# Patient Record
Sex: FEMALE | Race: WHITE | NOT HISPANIC OR LATINO | Employment: PART TIME | URBAN - METROPOLITAN AREA
[De-identification: names, ages, dates, MRNs, and addresses within clinical notes are randomized per-mention and may not be internally consistent; named-entity substitution may affect disease eponyms.]

---

## 2023-01-05 ENCOUNTER — OFFICE VISIT (OUTPATIENT)
Dept: CARDIOLOGY | Facility: MEDICAL CENTER | Age: 38
End: 2023-01-05
Payer: COMMERCIAL

## 2023-01-05 ENCOUNTER — NON-PROVIDER VISIT (OUTPATIENT)
Dept: CARDIOLOGY | Facility: MEDICAL CENTER | Age: 38
End: 2023-01-05
Payer: COMMERCIAL

## 2023-01-05 VITALS
SYSTOLIC BLOOD PRESSURE: 112 MMHG | HEART RATE: 84 BPM | BODY MASS INDEX: 25.16 KG/M2 | DIASTOLIC BLOOD PRESSURE: 74 MMHG | WEIGHT: 142 LBS | HEIGHT: 63 IN | RESPIRATION RATE: 16 BRPM | OXYGEN SATURATION: 96 %

## 2023-01-05 DIAGNOSIS — I49.1 APC (ATRIAL PREMATURE CONTRACTIONS): ICD-10-CM

## 2023-01-05 DIAGNOSIS — Z71.89 COUNSELING ON HEALTH PROMOTION AND DISEASE PREVENTION: ICD-10-CM

## 2023-01-05 DIAGNOSIS — I49.3 PVC (PREMATURE VENTRICULAR CONTRACTION): ICD-10-CM

## 2023-01-05 DIAGNOSIS — R00.2 PALPITATIONS: ICD-10-CM

## 2023-01-05 DIAGNOSIS — R00.2 PALPITATIONS: Primary | ICD-10-CM

## 2023-01-05 PROCEDURE — 99203 OFFICE O/P NEW LOW 30 MIN: CPT | Performed by: INTERNAL MEDICINE

## 2023-01-05 RX ORDER — DICLOFENAC POTASSIUM 50 MG/1
TABLET, FILM COATED ORAL
COMMUNITY
Start: 2022-12-20 | End: 2023-01-05

## 2023-01-05 RX ORDER — AMOXICILLIN AND CLAVULANATE POTASSIUM 875; 125 MG/1; MG/1
1 TABLET, FILM COATED ORAL 2 TIMES DAILY
COMMUNITY
Start: 2022-11-06 | End: 2023-01-05

## 2023-01-05 RX ORDER — ONDANSETRON 4 MG/1
4 TABLET, ORALLY DISINTEGRATING ORAL
COMMUNITY
Start: 2022-07-13 | End: 2023-01-05

## 2023-01-05 ASSESSMENT — FIBROSIS 4 INDEX: FIB4 SCORE: 0.6

## 2023-01-05 NOTE — PROGRESS NOTES
Patient enrolled in the 14 day Zio XT Holter monitoring program, per Nicolas Warren M.D.  >In clinic hook up, monitor S/N [id number].  >Pending EOS.

## 2023-01-05 NOTE — PROGRESS NOTES
CARDIOLOGY NEW PATIENT:    PCP: Arianna Anaya M.D.    1. Palpitations    2. PVC (premature ventricular contraction)    3. Counseling on health promotion and disease prevention        Kyleigh Cherry referred for PVCs.    Chief Complaint   Patient presents with    Other     NP Dx: Bigeminy       History: Kyleigh Cherry is a 37 y.o. female here for palpitations. She os a ER nurse.    [place] [facility] [place] with chest palpitations and chest pressure, reassuring work-up in the ER, with EKG showing ventricular bigeminy that resolved on subsequent EKG.  Since she was feeling better with resolution of her PVCs, got 1L of fluids, she was discharged home with cardiology referral. Since thanksgiving had recurrence of palpitations but with chest pressure then. Since then, she is having symptoms every 2-3 days. 2 nights ago woke up at 3am and felt palpitations / chest squeeze / heaviness / breathlessness / dizziness. She was pregnant 12/8/22 at 6 weeks and then lost the baby.    Feb 2022: after a night shift, drank a lot of coffee, energy drink and had palpitations, went to an ER at [facility] on [facility]. Took prapranolol 20mg a week later for mild symptoms. Then her TSH was reportedly normal.    Exercises regularly without limitations of CP or SOB. No REYNALDO, orthopnea, PND.    EKG 12/6/22 5:46pm: Personally reviewed  Sinus rhythm, RSR' in V1 and V2, probably normal variant    I do not have the EKG that reportedly showed ventricular bigeminy in the ER on 12/6/2022 under media tab.    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  Non smoker  No marijuana use  Social alcohol use    Cardiovascular Risk Factors:  1. Smoking status: No  2. Type II Diabetes Mellitus: No  3. Hypertension: No  4. Dyslipidemia: No  5. Obesity / metabolic syndrome: No  6. Family history of ASCVD: Yes, maternal grandfather  7. Family history of premature ASCVD in a first degree relative (Male less than 55 years of  "age; Female less than 60 years of age): No  8. Sedentary lifestyle: No  9. Lack of exercise: No      PE:  /74 (BP Location: Left arm, Patient Position: Sitting, BP Cuff Size: Adult)   Pulse 84   Resp 16   Ht 1.6 m (5' 3\")   Wt 64.4 kg (142 lb)   SpO2 96%   BMI 25.15 kg/m²     Gen: well  HEENT: Symmetric face. Anicteric sclerae. Moist mucus membranes  NECK: No JVD. No lymphadenopathy  CARDIAC: Regular, Normal S1, S2, No murmur  VASCULATURE: carotids are normal bilaterally without bruit  RESP: Clear to auscultation bilaterally  ABD: Soft, non-tender, non-distended  EXT: No edema, no clubbing or cyanosis  SKIN: Warm and dry  NEURO: No gross deficits  PSYCH: Appropriate affect, participates in conversation    The ASCVD Risk score (Apoorva SAAVEDRA, et al., 2019) failed to calculate.    Past Medical History:   Diagnosis Date    Flu 1/11/2013     Past Surgical History:   Procedure Laterality Date    OTHER SURGICAL PROCEDURE  2003    oral surgery     No Known Allergies  Outpatient Encounter Medications as of 1/5/2023   Medication Sig Dispense Refill    Omega-3 Fatty Acids (FISH OIL PO) Take  by mouth.      VITAMIN D PO Take  by mouth.      [DISCONTINUED] amoxicillin-clavulanate (AUGMENTIN) 875-125 MG Tab Take 1 Tablet by mouth 2 times a day. (Patient not taking: Reported on 1/5/2023)      [DISCONTINUED] diclofenac (CATAFLAM) 50 MG tablet  (Patient not taking: Reported on 1/5/2023)      [DISCONTINUED] ondansetron (ZOFRAN ODT) 4 MG TABLET DISPERSIBLE Take 4 mg by mouth. (Patient not taking: Reported on 1/5/2023)      [DISCONTINUED] Prenatal Vit-Fe Fumarate-FA (PRENATAL VITAMIN PO) Take 1 Tablet by mouth. (Patient not taking: Reported on 1/5/2023)       No facility-administered encounter medications on file as of 1/5/2023.     Social History     Socioeconomic History    Marital status:      Spouse name: Not on file    Number of children: Not on file    Years of education: Not on file    Highest education level: " Not on file   Occupational History    Not on file   Tobacco Use    Smoking status: Never    Smokeless tobacco: Never   Substance and Sexual Activity    Alcohol use: Yes     Comment: socially    Drug use: No    Sexual activity: Yes     Partners: Male     Birth control/protection: I.U.D.   Other Topics Concern    Not on file   Social History Narrative    Not on file     Social Determinants of Health     Financial Resource Strain: Not on file   Food Insecurity: Not on file   Transportation Needs: Not on file   Physical Activity: Not on file   Stress: Not on file   Social Connections: Not on file   Intimate Partner Violence: Not on file   Housing Stability: Not on file     Family History   Problem Relation Age of Onset    Hypertension Mother     Cancer Father         Melanoma    Hypertension Father     Hyperlipidemia Father     Heart Disease Maternal Grandfather         70's         Studies    Lab Results   Component Value Date/Time    TSHULTRASEN 1.67 07/25/2013 0000      No results found for: FREET4   No results found for: HBA1C  No results found for: CHOLSTRLTOT, LDL, HDL, TRIGLYCERIDE    Lab Results   Component Value Date/Time    SODIUM 135 (L) 12/06/2022 04:00 PM    POTASSIUM 3.5 12/06/2022 04:00 PM    CHLORIDE 102 12/06/2022 04:00 PM    CO2 24 12/06/2022 04:00 PM    GLUCOSE 125 (H) 12/06/2022 04:00 PM    BUN 15 12/06/2022 04:00 PM    CREATININE 0.7 12/06/2022 04:00 PM     Lab Results   Component Value Date/Time    ALKPHOSPHAT 44 (L) 12/06/2022 04:00 PM    ASTSGOT 20 12/06/2022 04:00 PM    ALTSGPT 18 12/06/2022 04:00 PM    TBILIRUBIN 0.5 12/06/2022 04:00 PM        Echocardiogram:  No results found for this or any previous visit.        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  We will further evaluate her PVC burden via cardiac event monitor (due to insurance, unable to start with the 30day, hence 2 weeks Zio will be done) before deciding on proceeding with an echocardiogram especially if she has more than 10% PVC burden.  Accordingly we will decide on management plan.  I discussed with her the options of metoprolol, propranolol as needed and flecainide, and we will finalize according to above testing results.    Congratulated her on her efforts to continue to exercise and to eat a heart healthy diet, and to stay well-hydrated.    Thank you for the opportunity to be involved in Kyleigh Cherry 's care; and please reach out with any questions or concerns.    Return if symptoms worsen or fail to improve.    Nicolas Warren MD, MPH [facility]  Interventional Cardiologist  [facility]   of Clinical Internal Medicine - [facility]    ~ Portions of this note were completed using voice recognition software (Dragon Naturally speaking software) . Occasional transcription errors may have escaped proof reading. I have made every reasonable attempt to correct obvious errors, but I expect that there are errors of grammar and possibly content that I did not discover before finalizing the note. ~

## 2023-01-31 ENCOUNTER — TELEPHONE (OUTPATIENT)
Dept: CARDIOLOGY | Facility: MEDICAL CENTER | Age: 38
End: 2023-01-31
Payer: COMMERCIAL

## 2023-01-31 DIAGNOSIS — R00.2 PALPITATIONS: ICD-10-CM

## 2023-01-31 DIAGNOSIS — I49.3 PVC (PREMATURE VENTRICULAR CONTRACTION): ICD-10-CM

## 2023-02-01 PROCEDURE — 93248 EXT ECG>7D<15D REV&INTERPJ: CPT | Performed by: INTERNAL MEDICINE

## 2023-02-01 PROCEDURE — 93246 EXT ECG>7D<15D RECORDING: CPT | Performed by: INTERNAL MEDICINE

## 2024-11-14 PROBLEM — O09.529 ANTEPARTUM MULTIGRAVIDA OF ADVANCED MATERNAL AGE: Status: ACTIVE | Noted: 2024-11-14

## 2024-11-18 ENCOUNTER — INITIAL PRENATAL (OUTPATIENT)
Dept: OBGYN | Facility: CLINIC | Age: 39
End: 2024-11-18
Payer: COMMERCIAL

## 2024-11-18 ENCOUNTER — HOSPITAL ENCOUNTER (OUTPATIENT)
Facility: MEDICAL CENTER | Age: 39
End: 2024-11-18
Attending: OBSTETRICS & GYNECOLOGY
Payer: COMMERCIAL

## 2024-11-18 VITALS — DIASTOLIC BLOOD PRESSURE: 80 MMHG | SYSTOLIC BLOOD PRESSURE: 127 MMHG | WEIGHT: 143.5 LBS | BODY MASS INDEX: 25.42 KG/M2

## 2024-11-18 DIAGNOSIS — R00.2 PALPITATIONS: ICD-10-CM

## 2024-11-18 DIAGNOSIS — Z34.81 ENCOUNTER FOR SUPERVISION OF OTHER NORMAL PREGNANCY, FIRST TRIMESTER: ICD-10-CM

## 2024-11-18 DIAGNOSIS — O09.529 ANTEPARTUM MULTIGRAVIDA OF ADVANCED MATERNAL AGE: Primary | ICD-10-CM

## 2024-11-18 PROCEDURE — 3079F DIAST BP 80-89 MM HG: CPT | Performed by: OBSTETRICS & GYNECOLOGY

## 2024-11-18 PROCEDURE — 87491 CHLMYD TRACH DNA AMP PROBE: CPT

## 2024-11-18 PROCEDURE — 88142 CYTOPATH C/V THIN LAYER: CPT

## 2024-11-18 PROCEDURE — 87591 N.GONORRHOEAE DNA AMP PROB: CPT

## 2024-11-18 PROCEDURE — 0501F PRENATAL FLOW SHEET: CPT | Performed by: OBSTETRICS & GYNECOLOGY

## 2024-11-18 PROCEDURE — 3074F SYST BP LT 130 MM HG: CPT | Performed by: OBSTETRICS & GYNECOLOGY

## 2024-11-18 RX ORDER — MAGNESIUM GLYCINATE 100 MG
CAPSULE ORAL
COMMUNITY

## 2024-11-18 RX ORDER — ASPIRIN 81 MG/1
81 TABLET ORAL DAILY
Qty: 100 TABLET | Refills: 2 | Status: SHIPPED | OUTPATIENT
Start: 2024-11-18

## 2024-11-18 RX ORDER — ONDANSETRON 4 MG/1
4 TABLET, FILM COATED ORAL EVERY 4 HOURS PRN
Qty: 20 TABLET | Refills: 1 | Status: SHIPPED | OUTPATIENT
Start: 2024-11-18

## 2024-11-18 ASSESSMENT — EDINBURGH POSTNATAL DEPRESSION SCALE (EPDS)
I HAVE FELT SCARED OR PANICKY FOR NO GOOD REASON: NO, NOT MUCH
THE THOUGHT OF HARMING MYSELF HAS OCCURRED TO ME: NEVER
I HAVE FELT SAD OR MISERABLE: NO, NOT AT ALL
I HAVE BEEN SO UNHAPPY THAT I HAVE BEEN CRYING: NO, NEVER
THINGS HAVE BEEN GETTING ON TOP OF ME: NO, I HAVE BEEN COPING AS WELL AS EVER
I HAVE BEEN ANXIOUS OR WORRIED FOR NO GOOD REASON: YES, VERY OFTEN
I HAVE BLAMED MYSELF UNNECESSARILY WHEN THINGS WENT WRONG: NO, NEVER
I HAVE BEEN ABLE TO LAUGH AND SEE THE FUNNY SIDE OF THINGS: AS MUCH AS I ALWAYS COULD
TOTAL SCORE: 4
I HAVE BEEN SO UNHAPPY THAT I HAVE HAD DIFFICULTY SLEEPING: NOT AT ALL
I HAVE LOOKED FORWARD WITH ENJOYMENT TO THINGS: AS MUCH AS I EVER DID

## 2024-11-18 ASSESSMENT — FIBROSIS 4 INDEX: FIB4 SCORE: 0.64

## 2024-11-18 NOTE — PROGRESS NOTES
Establish Pregnancy Visit    CC: First OB Visit    HPI: Patient is a 39 y.o.  at 11w2d who presents for her first OB visit.  She is not yet feeling fetal movement.  She denies vaginal bleeding, denies nausea, denies vomiting.  Mild cramping, intermittent. She denies headaches, or urinary symptoms.      The patient reports that this pregnancy is a Desired. FOP is Involved. Feels she has adequate support in this pregnancy. FOB is different than other children.     - History of genital HSV: no  - History of Varicella vaccination or infection: yes - infection as a child  - Personal, FOB, or family history of congenital anomalies or neuro-cognitive delays:no  - Accepts blood products if indicated: yes     DATING:     Procedure:  Transabdominal  US performed by me and per my read:  Indication: Amenorrhea, +UPT.     First Trimester US:  Type of US Transabdominal   LMP  ~ 24, uncertain   Fetal Number  1   Yolk Sac not seen   CRL 5.15 mm   Gestational age by CRL  11w6d   Cardiac Activity Present, Regular;, and ~130 BPM   Cervix/Uterus/Gestational sac Normal uterine echotexture and grossly normal shape.  Normal cervix.   Normal appearance of the gestational sac.   Location of pregnancy Intrauterine   Adnexa and bladder Right ovary:Not seen   Left ovary:Not seen   Corpus Luteum - Not Seen  Bladder: Normal   Final Diagnosis Viable IUP   Final SHELBY  06/3/2025   by 1T US (not certain LMP)       GYN HX:   Last Pap: Thinks ~ 1 year ago  Hx Moderate or Severe Dysplasia : no  History of LEEP or CKC: no  Hx STD : no      OBSTETRIC HISTORY:  OB History    Para Term  AB Living   4 2 2   1 2   SAB IAB Ectopic Molar Multiple Live Births     1       2      # Outcome Date GA Lbr Bar/2nd Weight Sex Type Anes PTL Lv   4 Current            3 IAB  6w0d    TAB         Birth Comments: Elected     2 Term 17 40w2d  6 lb 5 oz F Vag-Spont None N FERNANDO      Birth Comments: NAtural no complications   1 Term  10/16/13 40w2d  7 lb M Vag-Spont EPI Y FERNANDO      Birth Comments: pretem, labor @ 28 weeks, got medication, caused by costochondritis, carried to term       MEDICAL HISTORY:  Past Medical History:   Diagnosis Date    Flu 01/11/2013    Palpitations     s/p heart monitor, rare PVC's, no other abnormalities       SURGICAL HISTORY:  Past Surgical History:   Procedure Laterality Date    OTHER SURGICAL PROCEDURE  01/01/2003    oral surgery, wisdom teeth    OTHER      [facility]    OTHER SURGICAL PROCEDURE      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       FAMILY HISTORY:  Family History   Problem Relation Age of Onset    Hypertension Mother     Cancer Father         Melanoma    Hypertension Father     Hyperlipidemia Father     Prostate cancer Father     Alcohol abuse Brother         One borther    Parkinson's Disease Maternal Grandmother     Dementia Maternal Grandmother     Dementia Maternal Grandfather     Stroke Maternal Grandfather     Heart Disease Maternal Grandfather         70's       MEDICATIONS:  Current Outpatient Medications on File Prior to Visit   Medication Sig Dispense Refill    Magnesium Glycinate 100 MG Cap Take  by mouth.      Prenatal MV-Min-Fe Fum-FA-DHA (PRENATAL 1 PO) Take  by mouth.      Doxylamine Succinate, Sleep, (UNISOM PO) Take  by mouth 1 time a day as needed.      Omega-3 Fatty Acids (FISH OIL PO) Take  by mouth.      VITAMIN D PO Take  by mouth.       No current facility-administered medications on file prior to visit.       ALLERGIES / REACTIONS:  No Known Allergies             SOCIAL HISTORY:   reports that she has never smoked. She has never used smokeless tobacco. She reports that she does not currently use alcohol. She reports that she does not currently use drugs after having used the following drugs: Marijuana.    RN in ED- [employer]    ROS:   Gen: no fevers or chills, no significant weight loss or gain, excessive fatigue  Respiratory:  no cough or  dyspnea  Cardiac:  no chest pain, no palpitations, no syncope  Breast: no breast discharge, pain, lump or skin changes  GI:  no heartburn, no abdominal pain, no nausea or vomiting  Urinary: no dysuria, urgency, frequency, incontinence   Psych: no depression or anxiety  Neuro: no migraines with aura, fainting spells, numbness or tingling  Extremities: no joint pain, persistently swollen ankles, recurrent leg cramps         PHYSICAL EXAMINATION:  Vital Signs:   Vitals:    11/18/24 1324   BP: 127/80   Weight: 143 lb 8 oz     Body mass index is 25.42 kg/m².  Constitutional: The patient is well developed and well nourished.  Psychiatric: Patient is oriented to time place and person.   Skin: No rash observed.  Breast: Normal skin, areolas; no masses or tenderness bilaterally. No adenopathy bilaterally.  Neck: Neck appears symmetric. There are no masses or adenopathy present.  Respiratory: normal effort on room air  Cardiac: regular rate  Abdomen: Soft, non-tender.  Pelvic:    Vulva: normal.    Urethra: normal.   Vagina: normal.    Cervix: normal.    Uterus: consistent with dates    Adnexa: normal.   Perineum: normal.   GC / Chlamydia cultures obtained.   Pap Smear Obtained: yes  Extremeties: Legs are symmetric and without tenderness. There is no edema present.    ACOG SCREENING  Infection Prevention  1. High Risk For HIV: No 6. Rash Or Illness Since LMP: No     2. High Risk For Hepatitis B or C: No 7. History Of STD, GC, Chlamydia, HPV Syphilis: No     3. Live With Someone With TB Or Exposed To TB: No 8. Have a cat in the home?: No     4. Patient Or Partner Has A History Of Herpes: No 8a. Responsible for changing the litter?: No     5. History of Chicken Pox: Yes (Comment: As a child) 9. Other (See Comments Below): No   Comments: Pregnancy unplanned and wanted. FOB involved, not the same father as other children. MOB currently working.          Genetic Screening/Teratology Counseling- Includes patient, baby's father, or  anyone in either family with:  Patient's age 35 years or older as of estimated date of delivery: No     Thalassemia (Italian, Greek, Mediterranean, or  background): MCV less than 80: No     Neural tube defect (Meningomyelocele, Spina bifida, or Anencephaly): No     Congenital heart defect: No     Down syndrome: No     Rogers-Sachs (Ashkenazi Rastafarian, Cajun, Tajik [place]): No     Canavan disease (Ashkenazi Rastafarian): No     Familial dysautonomia (Ashkenazi Rastafarian): No     Sickle cell disease or trait (): No     Hemophilia or other blood disorders: No     Muscular dystrophy: No    Cystic fibrosis: No     [place]'s chorea: No     Mental retardation/autism: No     Other inherited genetic or chromosomal disorder: No     Maternal metabolic disorder (eg. Type 1 diabetes, PKU): No     Patient or baby's father had child with birth defects not listed above: No     Recurrent pregnancy loss, or a stillbirth: No     Medications (including supplements, vitamins, herbs, or OTC drugs)/illicit/recreational drugs/alcohol since last menstrual period: No                 ASSESSMENT AND PLAN:  39 y.o.  at 11w2d who presents for new OB visit. Dated by  11.6wk US, final SHELBY: 6/3/24  Assessment & Plan  Palpitations  S/p 2 week zio patch monitor, rare PVC's, no other issues  [x] monitor symptoms in pregnancy  [ ] follow-up with cardiology for palpitations PRN       Antepartum multigravida of advanced maternal age  [ ] 1T labs: ordered   [ ] Pap: collected   [ ] Genetic screening: Panorama NIPT and Hemoglobinopathy  [ ] AFP (15-20 wks): next visit  Orders:    URINE CULTURE(NEW); Future    URINE DRUG SCREEN W/CONF (AR); Future    RUBELLA ABS IGG; Future    ABO AND RH DETERMINATION; Future    ANTIBODY SCREEN; Future    CBC WITHOUT DIFFERENTIAL; Future    Comp Metabolic Panel; Future    HEP C VIRUS ANTIBODY; Future    T.PALLIDUM AB MARLON (SCREENING); Future    HIV AG/AB COMBO ASSAY SCREENING; Future    HEMOGLOBINOPATHY  PROFILE; Future    HEMOGLOBIN A1C; Future    HEP B SURFACE ANTIGEN; Future    US-OB 2ND 3RD TRI COMPLETE; Future      Routine OB care:  - Labs/US: Reviewed and ordered routine PNL and anatomy US.   - Genetics: Discussed options for genetic/aneuploidy and hemoglobinopathy testing and information given for pt to consider.  Advised to call insurance for cost of testing.  - Desires: Panorama NIPT and Hemoglobinopathy.   - PIH risk reduction: Low Dose ASA indicated?: Yes, Rx provided.  - Weight gain goal: BMI 25.0-29.9: 15-25 lbs    Counseling:  - PNV: Recommended continuing or starting PNV if not already taking  - Diet: Increase water intake and encouraged healthy nutrition, reviewed recommended changes to diet in pregnancy.  - Vaccines: Discussed recommendation for TDAP vaccination; additionally recommended flu, Covid vaccine, and RSV during pregnancy based on season and timing of last immunization.  - Exercise: Reviewed that moderate exercise into the 3rd trimester is associated with improved outcomes. Any contact sports, extreme activities, or that requires more than moderate exertion should be discussed before undertaking.   - Office policies: Discussed office policies, prenatal care timeline, weight gain, diet and activity. Pregnancy packet provided.   - Reviewed indications to seek emergency care: including heavy bleeding, LOF, of severe abdominal pain.    Return in 4 weeks for next prenatal visit.     Cary Quintero MD  Obstetrics and Gynecology

## 2024-11-18 NOTE — ASSESSMENT & PLAN NOTE
S/p 2 week zio patch monitor, rare PVC's, no other issues  [x] monitor symptoms in pregnancy  [ ] follow-up with cardiology for palpitations PRN

## 2024-11-18 NOTE — PROGRESS NOTES
Patient here for New OB   LMP: 8/31/24 Approx   SHELBY: 6/7/25  GA: 11w2d   Last pap: within the last year with PCP, MAGALY per patient   Phone: [phone]  Pharmacy verified   EDPS: 4  Genetic testing: NIPT, AFP     Pt states she is following up with cardiologists for her palpitations , she had unofficial US with M.D. in her office (week further ahead)

## 2024-11-18 NOTE — ASSESSMENT & PLAN NOTE
[ ] 1T labs: ordered 11/18  [ ] Pap: collected 11/18  [ ] Genetic screening: Panorama NIPT and Hemoglobinopathy  [ ] AFP (15-20 wks): next visit  Orders:    URINE CULTURE(NEW); Future    URINE DRUG SCREEN W/CONF (AR); Future    RUBELLA ABS IGG; Future    ABO AND RH DETERMINATION; Future    ANTIBODY SCREEN; Future    CBC WITHOUT DIFFERENTIAL; Future    Comp Metabolic Panel; Future    HEP C VIRUS ANTIBODY; Future    T.PALLIDUM AB MARLON (SCREENING); Future    HIV AG/AB COMBO ASSAY SCREENING; Future    HEMOGLOBINOPATHY PROFILE; Future    HEMOGLOBIN A1C; Future    HEP B SURFACE ANTIGEN; Future    US-OB 2ND 3RD TRI COMPLETE; Future

## 2024-11-19 ENCOUNTER — HOSPITAL ENCOUNTER (OUTPATIENT)
Dept: LAB | Facility: MEDICAL CENTER | Age: 39
End: 2024-11-19
Attending: OBSTETRICS & GYNECOLOGY
Payer: COMMERCIAL

## 2024-11-19 DIAGNOSIS — O09.529 ANTEPARTUM MULTIGRAVIDA OF ADVANCED MATERNAL AGE: ICD-10-CM

## 2024-11-19 DIAGNOSIS — Z34.81 ENCOUNTER FOR SUPERVISION OF OTHER NORMAL PREGNANCY, FIRST TRIMESTER: ICD-10-CM

## 2024-11-19 LAB
ABO GROUP BLD: NORMAL
ALBUMIN SERPL BCP-MCNC: 4 G/DL (ref 3.2–4.9)
ALBUMIN/GLOB SERPL: 1.4 G/DL
ALP SERPL-CCNC: 44 U/L (ref 30–99)
ALT SERPL-CCNC: 13 U/L (ref 2–50)
ANION GAP SERPL CALC-SCNC: 10 MMOL/L (ref 7–16)
AST SERPL-CCNC: 12 U/L (ref 12–45)
BILIRUB SERPL-MCNC: 0.3 MG/DL (ref 0.1–1.5)
BLD GP AB SCN SERPL QL: NORMAL
BUN SERPL-MCNC: 10 MG/DL (ref 8–22)
C TRACH DNA GENITAL QL NAA+PROBE: NEGATIVE
CALCIUM ALBUM COR SERPL-MCNC: 8.8 MG/DL (ref 8.5–10.5)
CALCIUM SERPL-MCNC: 8.8 MG/DL (ref 8.5–10.5)
CHLORIDE SERPL-SCNC: 103 MMOL/L (ref 96–112)
CO2 SERPL-SCNC: 22 MMOL/L (ref 20–33)
CREAT SERPL-MCNC: 0.44 MG/DL (ref 0.5–1.4)
ERYTHROCYTE [DISTWIDTH] IN BLOOD BY AUTOMATED COUNT: 42.5 FL (ref 35.9–50)
EST. AVERAGE GLUCOSE BLD GHB EST-MCNC: 103 MG/DL
GFR SERPLBLD CREATININE-BSD FMLA CKD-EPI: 126 ML/MIN/1.73 M 2
GLOBULIN SER CALC-MCNC: 2.8 G/DL (ref 1.9–3.5)
GLUCOSE SERPL-MCNC: 80 MG/DL (ref 65–99)
HBA1C MFR BLD: 5.2 % (ref 4–5.6)
HBV SURFACE AG SER QL: NORMAL
HCT VFR BLD AUTO: 42 % (ref 37–47)
HCV AB SER QL: NORMAL
HGB BLD-MCNC: 14.1 G/DL (ref 12–16)
HIV 1+2 AB+HIV1 P24 AG SERPL QL IA: NORMAL
MCH RBC QN AUTO: 31.4 PG (ref 27–33)
MCHC RBC AUTO-ENTMCNC: 33.6 G/DL (ref 32.2–35.5)
MCV RBC AUTO: 93.5 FL (ref 81.4–97.8)
N GONORRHOEA DNA GENITAL QL NAA+PROBE: NEGATIVE
PLATELET # BLD AUTO: 264 K/UL (ref 164–446)
PMV BLD AUTO: 10.3 FL (ref 9–12.9)
POTASSIUM SERPL-SCNC: 4.3 MMOL/L (ref 3.6–5.5)
PROT SERPL-MCNC: 6.8 G/DL (ref 6–8.2)
RBC # BLD AUTO: 4.49 M/UL (ref 4.2–5.4)
RH BLD: NORMAL
RUBV AB SER QL: 448 IU/ML
SODIUM SERPL-SCNC: 135 MMOL/L (ref 135–145)
SPECIMEN SOURCE: NORMAL
T PALLIDUM AB SER QL IA: NORMAL
WBC # BLD AUTO: 10 K/UL (ref 4.8–10.8)

## 2024-11-19 PROCEDURE — 86762 RUBELLA ANTIBODY: CPT

## 2024-11-19 PROCEDURE — 87086 URINE CULTURE/COLONY COUNT: CPT

## 2024-11-19 PROCEDURE — 87389 HIV-1 AG W/HIV-1&-2 AB AG IA: CPT

## 2024-11-19 PROCEDURE — 86780 TREPONEMA PALLIDUM: CPT

## 2024-11-19 PROCEDURE — 83036 HEMOGLOBIN GLYCOSYLATED A1C: CPT

## 2024-11-19 PROCEDURE — 36415 COLL VENOUS BLD VENIPUNCTURE: CPT

## 2024-11-19 PROCEDURE — 86900 BLOOD TYPING SEROLOGIC ABO: CPT

## 2024-11-19 PROCEDURE — 80053 COMPREHEN METABOLIC PANEL: CPT

## 2024-11-19 PROCEDURE — 85027 COMPLETE CBC AUTOMATED: CPT

## 2024-11-19 PROCEDURE — 86850 RBC ANTIBODY SCREEN: CPT

## 2024-11-19 PROCEDURE — 80307 DRUG TEST PRSMV CHEM ANLYZR: CPT

## 2024-11-19 PROCEDURE — 86803 HEPATITIS C AB TEST: CPT

## 2024-11-19 PROCEDURE — 86901 BLOOD TYPING SEROLOGIC RH(D): CPT

## 2024-11-19 PROCEDURE — 87340 HEPATITIS B SURFACE AG IA: CPT

## 2024-11-21 LAB
AMPHET CTO UR CFM-MCNC: NEGATIVE NG/ML
BACTERIA UR CULT: NORMAL
BARBITURATES CTO UR CFM-MCNC: NEGATIVE NG/ML
BENZODIAZ CTO UR CFM-MCNC: NEGATIVE NG/ML
CANNABINOIDS CTO UR CFM-MCNC: NEGATIVE NG/ML
COCAINE CTO UR CFM-MCNC: NEGATIVE NG/ML
CREAT UR-MCNC: 42.6 MG/DL (ref 20–400)
DRUG COMMENT 753798: NORMAL
METHADONE CTO UR CFM-MCNC: NEGATIVE NG/ML
OPIATES CTO UR CFM-MCNC: NEGATIVE NG/ML
PCP CTO UR CFM-MCNC: NEGATIVE NG/ML
PROPOXYPH CTO UR CFM-MCNC: NEGATIVE NG/ML
SIGNIFICANT IND 70042: NORMAL
SITE SITE: NORMAL
SOURCE SOURCE: NORMAL

## 2024-11-28 LAB
Lab: NORMAL
NTRA 1P36 DELETION SYNDROME POPULATION-BASED RISK TEXT: NORMAL
NTRA 1P36 DELETION SYNDROME RESULT TEXT: NORMAL
NTRA 1P36 DELETION SYNDROME RISK SCORE TEXT: NORMAL
NTRA 22Q11.2 DELETION SYNDROME POPULATION-BASED RISK TEXT: NORMAL
NTRA 22Q11.2 DELETION SYNDROME RESULT TEXT: NORMAL
NTRA 22Q11.2 DELETION SYNDROME RISK SCORE TEXT: NORMAL
NTRA ANGELMAN SYNDROME POPULATION-BASED RISK TEXT: NORMAL
NTRA ANGELMAN SYNDROME RESULT TEXT: NORMAL
NTRA ANGELMAN SYNDROME RISK SCORE TEXT: NORMAL
NTRA CRI-DU-CHAT SYNDROME POPULATION-BASED RISK TEXT: NORMAL
NTRA CRI-DU-CHAT SYNDROME RESULT TEXT: NORMAL
NTRA CRI-DU-CHAT SYNDROME RISK SCORE TEXT: NORMAL
NTRA FETAL FRACTION: NORMAL
NTRA GENDER OF FETUS: NORMAL
NTRA MONOSOMY X AGE-BASED RISK TEXT: NORMAL
NTRA MONOSOMY X RESULT TEXT: NORMAL
NTRA MONOSOMY X RISK SCORE TEXT: NORMAL
NTRA PRADER-WILLI SYNDROME POPULATION-BASED RISK TEXT: NORMAL
NTRA PRADER-WILLI SYNDROME RESULT TEXT: NORMAL
NTRA PRADER-WILLI SYNDROME RISK SCORE TEXT: NORMAL
NTRA TRIPLOIDY RESULT TEXT: NORMAL
NTRA TRISOMY 13 AGE-BASED RISK TEXT: NORMAL
NTRA TRISOMY 13 RESULT TEXT: NORMAL
NTRA TRISOMY 13 RISK SCORE TEXT: NORMAL
NTRA TRISOMY 18 AGE-BASED RISK TEXT: NORMAL
NTRA TRISOMY 18 RESULT TEXT: NORMAL
NTRA TRISOMY 18 RISK SCORE TEXT: NORMAL
NTRA TRISOMY 21 AGE-BASED RISK TEXT: NORMAL
NTRA TRISOMY 21 RESULT TEXT: NORMAL
NTRA TRISOMY 21 RISK SCORE TEXT: NORMAL

## 2024-12-04 LAB
HPV I/H RISK 1 DNA SPEC QL NAA+PROBE: NOT DETECTED
SPECIMEN SOURCE: NORMAL
THINPREP PAP, CYTOLOGY NL11781: NORMAL

## 2024-12-18 ENCOUNTER — TELEPHONE (OUTPATIENT)
Dept: OBGYN | Facility: CLINIC | Age: 39
End: 2024-12-18

## 2024-12-18 ENCOUNTER — APPOINTMENT (OUTPATIENT)
Dept: OBGYN | Facility: CLINIC | Age: 39
End: 2024-12-18
Payer: COMMERCIAL

## 2024-12-23 ENCOUNTER — APPOINTMENT (OUTPATIENT)
Dept: OBGYN | Facility: CLINIC | Age: 39
End: 2024-12-23
Payer: COMMERCIAL

## 2024-12-26 ENCOUNTER — ROUTINE PRENATAL (OUTPATIENT)
Dept: OBGYN | Facility: CLINIC | Age: 39
End: 2024-12-26
Payer: COMMERCIAL

## 2024-12-26 ENCOUNTER — APPOINTMENT (OUTPATIENT)
Dept: OBGYN | Facility: CLINIC | Age: 39
End: 2024-12-26
Payer: COMMERCIAL

## 2024-12-26 VITALS — DIASTOLIC BLOOD PRESSURE: 66 MMHG | WEIGHT: 146 LBS | BODY MASS INDEX: 25.86 KG/M2 | SYSTOLIC BLOOD PRESSURE: 115 MMHG

## 2024-12-26 DIAGNOSIS — O09.529 ANTEPARTUM MULTIGRAVIDA OF ADVANCED MATERNAL AGE: ICD-10-CM

## 2024-12-26 PROCEDURE — 0502F SUBSEQUENT PRENATAL CARE: CPT

## 2024-12-26 PROCEDURE — 3074F SYST BP LT 130 MM HG: CPT

## 2024-12-26 PROCEDURE — 3078F DIAST BP <80 MM HG: CPT

## 2024-12-26 ASSESSMENT — FIBROSIS 4 INDEX: FIB4 SCORE: 0.48

## 2024-12-26 NOTE — PROGRESS NOTES
S: Pt is a 39 y.o.  at 17w2d gestation here today for routine prenatal care. Pt reports some flutters; denies cramping, vaginal bleeding, and leaking of fluid. No concerns today, feeling generally tired. 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     Reports taking aspirin daily.     O: /66   Wt 146 lb    *see prenatal flowsheet*     A: IUP at 17w2d       S=D         Patient Active Problem List    Diagnosis Date Noted    Antepartum multigravida of advanced maternal age 2024    Palpitations 2023    PVC (premature ventricular contraction) 2023    Healthcare Provider - RN,  Yoandy 2013       P:   -Discussed normal s/sx pregnancy appropriate for gestational age general discomforts vs warning signs that necessitate evaluation in OB triage. Reviewed fetal movements appropriate for EGA. Reinforced adequate hydration and nutrition.  -Has anatomy scan scheduled in 3 weeks  -AFP ordered today  -Continue low-dose ASA  -RTC in 4 weeks for routine prenatal care      Joan Blue C.N.M.

## 2024-12-26 NOTE — PROGRESS NOTES
Pt here today for OBFV   +FM movemnet  No VB, LOF. 's   Phone number [phone] (work)  Pharmacy verified   US 1/15

## 2025-01-13 ENCOUNTER — HOSPITAL ENCOUNTER (OUTPATIENT)
Dept: LAB | Facility: MEDICAL CENTER | Age: 40
End: 2025-01-13
Payer: COMMERCIAL

## 2025-01-13 DIAGNOSIS — O09.529 ANTEPARTUM MULTIGRAVIDA OF ADVANCED MATERNAL AGE: ICD-10-CM

## 2025-01-13 PROCEDURE — 36415 COLL VENOUS BLD VENIPUNCTURE: CPT

## 2025-01-13 PROCEDURE — 82105 ALPHA-FETOPROTEIN SERUM: CPT

## 2025-01-15 ENCOUNTER — APPOINTMENT (OUTPATIENT)
Dept: RADIOLOGY | Facility: MEDICAL CENTER | Age: 40
End: 2025-01-15
Attending: OBSTETRICS & GYNECOLOGY
Payer: COMMERCIAL

## 2025-01-15 DIAGNOSIS — O09.529 ANTEPARTUM MULTIGRAVIDA OF ADVANCED MATERNAL AGE: ICD-10-CM

## 2025-01-15 PROCEDURE — 76805 OB US >/= 14 WKS SNGL FETUS: CPT

## 2025-01-17 LAB
# FETUSES US: NORMAL
AFP MOM SERPL: 0.79
AFP SERPL-MCNC: 47 NG/ML
AGE - REPORTED: 39.3 YR
CURRENT SMOKER: NO
FAMILY MEMBER DISEASES HX: NO
GA METHOD: NORMAL
GA: NORMAL WK
IDDM PATIENT QL: NO
INTEGRATED SCN PATIENT-IMP: NORMAL
SPECIMEN DRAWN SERPL: NORMAL

## 2025-01-23 ENCOUNTER — ROUTINE PRENATAL (OUTPATIENT)
Dept: OBGYN | Facility: CLINIC | Age: 40
End: 2025-01-23
Payer: COMMERCIAL

## 2025-01-23 VITALS — WEIGHT: 148.1 LBS | DIASTOLIC BLOOD PRESSURE: 86 MMHG | BODY MASS INDEX: 26.23 KG/M2 | SYSTOLIC BLOOD PRESSURE: 104 MMHG

## 2025-01-23 DIAGNOSIS — O09.522 MULTIGRAVIDA OF ADVANCED MATERNAL AGE IN SECOND TRIMESTER: ICD-10-CM

## 2025-01-23 DIAGNOSIS — O09.92 HIGH-RISK PREGNANCY IN SECOND TRIMESTER: ICD-10-CM

## 2025-01-23 PROCEDURE — 3074F SYST BP LT 130 MM HG: CPT | Performed by: OBSTETRICS & GYNECOLOGY

## 2025-01-23 PROCEDURE — 3079F DIAST BP 80-89 MM HG: CPT | Performed by: OBSTETRICS & GYNECOLOGY

## 2025-01-23 PROCEDURE — 0502F SUBSEQUENT PRENATAL CARE: CPT | Performed by: OBSTETRICS & GYNECOLOGY

## 2025-01-23 ASSESSMENT — FIBROSIS 4 INDEX: FIB4 SCORE: 0.48

## 2025-01-23 NOTE — PROGRESS NOTES
Pt. Here for OB/FU.   21w2d  Reports Good FM.   Pt. Denies VB, LOF, or UC's.     Pt states no concerns today, but would like her genetic testing and US reviewed with her.   US completed 1/15.    Phone/Pharm verified.    [phone]